# Patient Record
(demographics unavailable — no encounter records)

---

## 2024-10-22 NOTE — REASON FOR VISIT
[Other: ____] : [unfilled] [FreeTextEntry1] : ======================================================================================= Diagnostic Tests: -------------------------------------------------------------- ECG: 10/22/24: sinus bradycardia at 58 bpm, first degree AV block. 01/08/24: sinus rhythm, first degree AV block.   03/09/23: sinus rhythm, first degree AV block.   04/26/22: sinus rhythm, first degree AV block.  06/17/21: sinus bradycardia at 59 bpm, first degree AV block.   08/28/20: sinus rhythm, single PVC.  -------------------------------------------------------------- Echo: 05/21/24: EF 71%, 25 mm Isabel-Kaufman bioprosthetic AVR (normally functioning).  09/15/22: EF 56%, mildly dilated LA, 25 mm Isabel-Kaufman bioprosthetic AVR (normally functioning).  05/28/19: EF 65%, mildly dilated LA, severe AS, mild MR, mild TR.  -------------------------------------------------------------- CT: 07/15/19: CT coronary arteries: LM <50%, LAD prox/mid patent stent, D1 mild, D2 jailed by LAD stent, LCX prox moderate, OM1/OM2 normal, LPL normal, RCA prox patent stent, RPDA normal. -------------------------------------------------------------- Cath: 07/22/19: LM distal 20%, LAD prox/mid patent stents, LCX normal, RCA prox patent stent, mid 50% ISR. -------------------------------------------------------------- Stress Tests: 04/12/23: exercise stress echo: EF 65%, 25 mm Isabel-Kaufman AVR (normally functioning), normal wall motion and PA pressures with an abnormal ECG post 10.1 METs of exercise.  07/29/21: exercise stress echo: EF 64%, mildly dilated LA, bioprosthetic AVR normally functioning, normal wall motion and PA pressures, post 10.1 METs, + ECG.  -------------------------------------------------------------- Carotid arteries: 03/09/23: sono: b/l proximal ICA 20-49% stenosis.  07/29/21: sono: b/l proximal ICA 20-49% stenosis.  -------------------------------------------------------------- Abdominal aorta - iliac arteries: 05/21/24: no AAA, mild diffuse atherosclerosis abdominal aorta, patent RCIA, LCIA, CA, SMA.

## 2024-10-22 NOTE — ASSESSMENT
[FreeTextEntry1] : ======================================================================================= 1. CAD s/p PCI p/mLAD and p/mRCA (2015):          - will pursue aggressive medical management              - continue single antiplatelet therapy with ASA 81mg po qd    2. Aortic stenosis, s/p 25 mm Isabel-Kaufman bioprosthetic AVR (08/01/19):          - will follow with serial echocardiograms           - discussed with patient SBE prophylaxis per AHA guidelines  3. Dyslipidemia: LDL 37 (05/02/24):          - continue rosuvastatin 40mg po qd          - discussed therapeutic lifestyle changes to promote improved lipid metabolism   4. DM2: A1c 7.2 % (05/02/24):  worsened recently:         - continue Invokamet -1000mg twice daily         - continue metformin 1000mg po bid         - discussed limiting dietary indiscretions   5. HTN: BP at ACC/AHA 2017 guideline target:          - continue metoprolol succinate 25mg po qd         - continue ramipril 2.5mg po qd  6. Carotid artery atherosclerosis: s/p 03/09/23: sono: b/l proximal ICA 20-49% stenosis:           - will pursue aggressive medical management as above          - will follow with serial carotid artery sonograms   7. Erectile dysfunction:          - continue sildenafil 100mg po qd prn

## 2024-10-22 NOTE — HISTORY OF PRESENT ILLNESS
[FreeTextEntry1] : Mr. Enamorado presents for follow up and management of dyslipidemia, DM2, hiatal hernia, CAD s/p PCI mLAD and pRCA (2015), chronic diastolic heart failure, aortic stenosis s/p AVR via mini thoracotomy (25mm Isabel-Kaufman Magna Ease), and ascending aorta atherectomy (08/01/19).  He was previously followed by Peter Felix MD.  On 04/12/23, he had an exercise stress echocardiogram which revealed an EF of 65%, a 25 mm Isabel-Kaufman AVR (normally functioning), and normal wall motion and PA pressures with an abnormal ECG post 10.1 METs of exercise.  On 05/21/24, he had an echocardiogram which revealed an EF of 71% and a 25 mm Isabel-Kaufman bioprosthetic AVR (normally functioning).   At present, he feels well.    
no

## 2025-01-16 NOTE — ASSESSMENT
[FreeTextEntry1] : ======================================================================================= 1. CAD s/p PCI p/mLAD and p/mRCA (2015):  - will pursue aggressive medical management      - continue single antiplatelet therapy with ASA 81mg po qd    2. Aortic stenosis, s/p 25 mm Isabel-Kaufman bioprosthetic AVR (08/01/19):  - will follow with serial echocardiograms (ordered today)   - discussed with patient SBE prophylaxis per AHA guidelines  3. Dyslipidemia: LDL 37 (05/02/24):   - continue rosuvastatin 40mg po qd   - discussed therapeutic lifestyle changes to promote improved lipid metabolism  - patient will provide copy of recent lab work from PMD's office for my review   4. DM2: A1c 7.2 % (05/02/24):  worsened recently:  - continue Invokamet -1000mg twice daily  - continue metformin 1000mg po bid  - discussed limiting dietary indiscretions   - patient will provide copy of recent lab work from PMD's office for my review  5. HTN: BP at ACC/AHA 2017 guideline target:   - continue metoprolol succinate 25mg po qd  - continue ramipril 2.5mg po qd  6. Carotid artery atherosclerosis: s/p 03/09/23: sono: b/l proximal ICA 20-49% stenosis:   - will pursue aggressive medical management as above  - will follow with serial carotid artery sonograms   7. Erectile dysfunction:  - continue sildenafil 100mg po qd prn

## 2025-01-16 NOTE — HISTORY OF PRESENT ILLNESS
[FreeTextEntry1] : Mr. Enamorado presents for follow up and management of dyslipidemia, DM2, hiatal hernia, CAD s/p PCI mLAD and pRCA (2015), chronic diastolic heart failure, aortic stenosis s/p AVR via mini thoracotomy (25mm Isabel-Kaufman Magna Ease), and ascending aorta atherectomy (08/01/19).  He was previously followed by Peter Felix MD.  On 04/12/23, he had an exercise stress echocardiogram which revealed an EF of 65%, a 25 mm Isabel-Kaufman AVR (normally functioning), and normal wall motion and PA pressures with an abnormal ECG post 10.1 METs of exercise.  On 05/21/24, he had an echocardiogram which revealed an EF of 71% and a 25 mm Isabel-Kaufman bioprosthetic AVR (normally functioning).   At present, he feels well.

## 2025-01-16 NOTE — PHYSICAL EXAM

## 2025-05-15 NOTE — PHYSICAL EXAM

## 2025-05-15 NOTE — REASON FOR VISIT
[Other: ____] : [unfilled] [FreeTextEntry1] : ======================================================================================= Diagnostic Tests: -------------------------------------------------------------- ECG: 10/22/24: sinus bradycardia at 58 bpm, first degree AV block. 01/08/24: sinus rhythm, first degree AV block.   03/09/23: sinus rhythm, first degree AV block.   04/26/22: sinus rhythm, first degree AV block.  06/17/21: sinus bradycardia at 59 bpm, first degree AV block.   08/28/20: sinus rhythm, single PVC.  -------------------------------------------------------------- Echo: 05/15/25: EF 70%, mildly dilated LA, 25 mm Isabel-Kaufman bioprosthetic AVR (normally functioning).  05/21/24: EF 71%, 25 mm Isabel-Kaufman bioprosthetic AVR (normally functioning).  09/15/22: EF 56%, mildly dilated LA, 25 mm Isabel-Kaufman bioprosthetic AVR (normally functioning).  05/28/19: EF 65%, mildly dilated LA, severe AS, mild MR, mild TR.  -------------------------------------------------------------- CT: 07/15/19: CT coronary arteries: LM <50%, LAD prox/mid patent stent, D1 mild, D2 jailed by LAD stent, LCX prox moderate, OM1/OM2 normal, LPL normal, RCA prox patent stent, RPDA normal. -------------------------------------------------------------- Cath: 07/22/19: LM distal 20%, LAD prox/mid patent stents, LCX normal, RCA prox patent stent, mid 50% ISR. -------------------------------------------------------------- Stress Tests: 04/12/23: exercise stress echo: EF 65%, 25 mm Isabel-Kaufman AVR (normally functioning), normal wall motion and PA pressures with an abnormal ECG post 10.1 METs of exercise.  07/29/21: exercise stress echo: EF 64%, mildly dilated LA, bioprosthetic AVR normally functioning, normal wall motion and PA pressures, post 10.1 METs, + ECG.  -------------------------------------------------------------- Carotid arteries: 03/09/23: sono: b/l proximal ICA 20-49% stenosis.  07/29/21: sono: b/l proximal ICA 20-49% stenosis.  -------------------------------------------------------------- Abdominal aorta - iliac arteries: 05/21/24: no AAA, mild diffuse atherosclerosis abdominal aorta, patent RCIA, LCIA, CA, SMA.

## 2025-05-15 NOTE — ASSESSMENT
[FreeTextEntry1] : ======================================================================================= 1. CAD s/p PCI p/mLAD and p/mRCA (2015):  - continue aggressive medical management  - continue single antiplatelet therapy with ASA 81mg po qd  - will follow left ventricular function with serial echocardiograms    2. Aortic stenosis, s/p 25 mm Isabel-Kaufman bioprosthetic AVR (08/01/19):  - will follow with serial echocardiograms   - discussed with patient SBE prophylaxis per AHA guidelines  3. Dyslipidemia: LDL 59 (04/01/25):   - continue rosuvastatin 20mg po qd (will pursue a 2-week trial off statins to screen for statin-associated arthralgia)   - discussed therapeutic lifestyle changes to promote improved lipid metabolism  4. DM2: A1c 7.1 (04/01/25):   - continue Invokamet -1000mg twice daily  - continue metformin 1000mg po bid  - discussed limiting dietary indiscretions   5. HTN: BP at ACC/AHA 2017 guideline target:    - continue metoprolol succinate 25mg 0.5 tab po qd  - continue ramipril 2.5mg po qd  - he wishes to pursue atrial off anti-HTN medications to see if they are needed   6. Carotid artery atherosclerosis: s/p 03/09/23: sono: b/l proximal ICA 20-49% stenosis:   - will pursue aggressive medical management as above  - will follow with serial carotid artery sonograms   7. Erectile dysfunction:  - continue sildenafil 100mg po qd prn

## 2025-05-15 NOTE — HISTORY OF PRESENT ILLNESS
[FreeTextEntry1] : Mr. Enamorado presents for follow up and management of dyslipidemia, DM2, hiatal hernia, CAD s/p PCI mLAD and pRCA (2015), chronic diastolic heart failure, aortic stenosis s/p AVR via mini thoracotomy (25mm Isabel-Kaufman Magna Ease), and ascending aorta atherectomy (08/01/19).  He was previously followed by Peter Felix MD.  On 04/12/23, he had an exercise stress echocardiogram which revealed an EF of 65%, a 25 mm Isabel-Kaufman AVR (normally functioning), and normal wall motion and PA pressures with an abnormal ECG post 10.1 METs of exercise.  At present, he has complaints of arthritis pain in his hands and shoulders.  Today, 05/15/25, he had an echocardiogram which revealed an EF of 70%, mildly dilated LA, and a 25 mm Isabel-Kaufman bioprosthetic AVR (normally functioning).